# Patient Record
Sex: FEMALE | Race: BLACK OR AFRICAN AMERICAN
[De-identification: names, ages, dates, MRNs, and addresses within clinical notes are randomized per-mention and may not be internally consistent; named-entity substitution may affect disease eponyms.]

---

## 2020-06-25 ENCOUNTER — HOSPITAL ENCOUNTER (OUTPATIENT)
Dept: HOSPITAL 62 - WI | Age: 45
End: 2020-06-25
Attending: NURSE PRACTITIONER
Payer: COMMERCIAL

## 2020-06-25 DIAGNOSIS — Z12.31: Primary | ICD-10-CM

## 2020-06-25 PROCEDURE — 77067 SCR MAMMO BI INCL CAD: CPT

## 2020-06-25 PROCEDURE — 77063 BREAST TOMOSYNTHESIS BI: CPT

## 2020-06-25 NOTE — WOMENS IMAGING REPORT
EXAM DESCRIPTION:  3D SCREENING MAMMO BILAT



IMAGES COMPLETED DATE/TIME:  6/25/2020 3:31 pm



REASON FOR STUDY:  Z12.31 ENCOUNTER FOR SCREENING MAMMOGRAM FOR MALIGNANT NEOPLASM OF BREAST Z12.31  
ENCNTR SCREEN MAMMOGRAM FOR MALIGNANT NEOPLASM OF TERESA



COMPARISON:  None.



EXAM PARAMETERS:  Views: Standard craniocaudal and mediolateral oblique views of each breast recorded
 using digital acquisition and breast tomosynthesis.

Read with the assistance of CAD.

.Formerly [facility] - R2  Version 9.2



LIMITATIONS:  None.



FINDINGS:  No suspicious masses, suspicious calcifications or architectural distortion. No areas of c
oncern.



IMPRESSION:   NEGATIVE MAMMOGRAM. BIRADS 1.



BREAST DENSITY:  b. There are scattered areas of fibroglandular density.



BIRAD:  ASSESSMENT:  1 NEGATIVE



RECOMMENDATION:  ROUTINE SCREENING



COMMENT:  The patient has been notified of the results by letter per MQSA requirements. Additional no
tification policies are in place for contacting patient with suspicious or incomplete findings.

Quality ID #225: The [employer] College of [employer] recommends an annual screening mammogram for women
 aged 40 years or over. This facility utilizes a reminder system to ensure that all patients receive 
reminder letters, and/or direct phone calls for appointments. This includes reminders for routine scr
eening mammograms, diagnostic mammograms, or other Breast Imaging Interventions when appropriate.  Th
is patient will be placed in the appropriate reminder system.



TECHNICAL DOCUMENTATION:  FINDING NUMBER: (1)

ASSESSMENT:  (1)

JOB ID:  [id number]

 2011 [employer]- All Rights Reserved



Reading location - IP/workstation name: [email or web address]

## 2020-07-10 LAB
ADD MANUAL DIFF: NO
ANION GAP SERPL CALC-SCNC: 8 MMOL/L (ref 5–19)
APPEARANCE UR: CLEAR
APTT PPP: YELLOW S
BASOPHILS # BLD AUTO: 0.1 10^3/UL (ref 0–0.2)
BASOPHILS NFR BLD AUTO: 1 % (ref 0–2)
BILIRUB UR QL STRIP: NEGATIVE
BUN SERPL-MCNC: 17 MG/DL (ref 7–20)
CALCIUM: 9.6 MG/DL (ref 8.4–10.2)
CHLORIDE SERPL-SCNC: 97 MMOL/L (ref 98–107)
CO2 SERPL-SCNC: 31 MMOL/L (ref 22–30)
EOSINOPHIL # BLD AUTO: 0 10^3/UL (ref 0–0.6)
EOSINOPHIL NFR BLD AUTO: 0.8 % (ref 0–6)
ERYTHROCYTE [DISTWIDTH] IN BLOOD BY AUTOMATED COUNT: 15.2 % (ref 11.5–14)
GLUCOSE SERPL-MCNC: 93 MG/DL (ref 75–110)
GLUCOSE UR STRIP-MCNC: NEGATIVE MG/DL
HCT VFR BLD CALC: 39.4 % (ref 36–47)
HGB BLD-MCNC: 13.1 G/DL (ref 12–15.5)
KETONES UR STRIP-MCNC: NEGATIVE MG/DL
LYMPHOCYTES # BLD AUTO: 1.4 10^3/UL (ref 0.5–4.7)
LYMPHOCYTES NFR BLD AUTO: 26.2 % (ref 13–45)
MCH RBC QN AUTO: 26.7 PG (ref 27–33.4)
MCHC RBC AUTO-ENTMCNC: 33.3 G/DL (ref 32–36)
MCV RBC AUTO: 80 FL (ref 80–97)
MONOCYTES # BLD AUTO: 0.3 10^3/UL (ref 0.1–1.4)
MONOCYTES NFR BLD AUTO: 6.5 % (ref 3–13)
NEUTROPHILS # BLD AUTO: 3.5 10^3/UL (ref 1.7–8.2)
NEUTS SEG NFR BLD AUTO: 65.5 % (ref 42–78)
NITRITE UR QL STRIP: NEGATIVE
PH UR STRIP: 7 [PH] (ref 5–9)
PLATELET # BLD: 238 10^3/UL (ref 150–450)
POTASSIUM SERPL-SCNC: 3.1 MMOL/L (ref 3.6–5)
PROT UR STRIP-MCNC: 30 MG/DL
RBC # BLD AUTO: 4.92 10^6/UL (ref 3.72–5.28)
SP GR UR STRIP: 1.02
TOTAL CELLS COUNTED % (AUTO): 100 %
UROBILINOGEN UR-MCNC: NEGATIVE MG/DL (ref ?–2)
WBC # BLD AUTO: 5.3 10^3/UL (ref 4–10.5)

## 2020-07-10 NOTE — RADIOLOGY REPORT (SQ)
EXAM DESCRIPTION:  CHEST PA/LATERAL



IMAGES COMPLETED DATE/TIME:  7/10/2020 12:46 pm



REASON FOR STUDY:  PREOP



COMPARISON:  None.



EXAM PARAMETERS:  NUMBER OF VIEWS: Two views.

TECHNIQUE:  PA and lateral views of the chest were obtained.

RADIATION DOSE: NA.

LIMITATIONS: None.



FINDINGS:  LUNGS AND PLEURA: No consolidation, pleural effusion or pneumothorax.

MEDIASTINUM AND HILAR STRUCTURES: No mediastinal or hilar contour abnormality.

HEART AND VASCULAR STRUCTURES: The cardiac silhouette and pulmonary vasculature are within normal shoemaker
its.

BONES: No acute findings.

HARDWARE: None in the chest.

OTHER: No other finding.



IMPRESSION:  No acute cardiopulmonary process.



TECHNICAL DOCUMENTATION:  JOB ID:  [id number]

 2011 [employer]- All Rights Reserved



Reading location - IP/workstation name: [email or web address]

## 2020-07-22 ENCOUNTER — HOSPITAL ENCOUNTER (OUTPATIENT)
Dept: HOSPITAL 62 - OROUT | Age: 45
Discharge: HOME | End: 2020-07-22
Attending: ORTHOPAEDIC SURGERY
Payer: COMMERCIAL

## 2020-07-22 VITALS — DIASTOLIC BLOOD PRESSURE: 90 MMHG | SYSTOLIC BLOOD PRESSURE: 139 MMHG

## 2020-07-22 DIAGNOSIS — M48.02: ICD-10-CM

## 2020-07-22 DIAGNOSIS — M50.122: Primary | ICD-10-CM

## 2020-07-22 PROCEDURE — C9803 HOPD COVID-19 SPEC COLLECT: HCPCS

## 2020-07-22 PROCEDURE — 85025 COMPLETE CBC W/AUTO DIFF WBC: CPT

## 2020-07-22 PROCEDURE — 80048 BASIC METABOLIC PNL TOTAL CA: CPT

## 2020-07-22 PROCEDURE — 93010 ELECTROCARDIOGRAM REPORT: CPT

## 2020-07-22 PROCEDURE — 87635 SARS-COV-2 COVID-19 AMP PRB: CPT

## 2020-07-22 PROCEDURE — 84132 ASSAY OF SERUM POTASSIUM: CPT

## 2020-07-22 PROCEDURE — 71046 X-RAY EXAM CHEST 2 VIEWS: CPT

## 2020-07-22 PROCEDURE — 72040 X-RAY EXAM NECK SPINE 2-3 VW: CPT

## 2020-07-22 PROCEDURE — 87070 CULTURE OTHR SPECIMN AEROBIC: CPT

## 2020-07-22 PROCEDURE — 81025 URINE PREGNANCY TEST: CPT

## 2020-07-22 PROCEDURE — 36415 COLL VENOUS BLD VENIPUNCTURE: CPT

## 2020-07-22 PROCEDURE — 22856 TOT DISC ARTHRP 1NTRSPC CRV: CPT

## 2020-07-22 PROCEDURE — 81001 URINALYSIS AUTO W/SCOPE: CPT

## 2020-07-22 PROCEDURE — 93005 ELECTROCARDIOGRAM TRACING: CPT

## 2020-07-22 NOTE — RADIOLOGY REPORT (SQ)
EXAM DESCRIPTION:  CERV SP 3 VIEW OR LESS; NO CHG FLUORO



IMAGES COMPLETED DATE/TIME:  7/22/2020 10:31 am



REASON FOR STUDY:  CERVICAL ARTIFICIAL DISK PLACEMENT ASSISTED WITH FLUORO IN OR M50.10  CERVICAL DIS
C DISORDER W RADICULOPATHY, UNSP CERVICAL M54.12  RADICULOPATHY, CERVICAL REGION M50.30  OTHER CERVIC
AL DISC DEGENERATION, UNSP CERVICAL REGIO



COMPARISON:  None.



FLUOROSCOPY TIME:  1.2 minutes

Spot images saved to PACS.



TECHNIQUE:  Intra-operative images acquired during surgical procedure to evaluate progress.

NUMBER OF IMAGES: 3



LIMITATIONS:  None.



FINDINGS:  Fluoroscopy was provided for intraoperative procedure.  Please refer to the operative repo
rt for further discussion.



IMPRESSION:  IMAGE(S) OBTAINED DURING PROCEDURE.



COMMENT:  Quality :  Final reports for procedures using fluoroscopy that document radiation exp
osure indices, or exposure time and number of fluorographic images (if radiation exposure indices are
 not available)

Please consult full operative report of the attending physician for description of the procedure.



TECHNICAL DOCUMENTATION:  JOB ID:  [id number]

 2011 [employer]- All Rights Reserved



Reading location - IP/workstation name: [email or web address]

## 2020-07-22 NOTE — RADIOLOGY REPORT (SQ)
EXAM DESCRIPTION:  CERV SP 3 VIEW OR LESS; NO CHG FLUORO



IMAGES COMPLETED DATE/TIME:  7/22/2020 10:31 am



REASON FOR STUDY:  CERVICAL ARTIFICIAL DISK PLACEMENT ASSISTED WITH FLUORO IN OR M50.10  CERVICAL DIS
C DISORDER W RADICULOPATHY, UNSP CERVICAL M54.12  RADICULOPATHY, CERVICAL REGION M50.30  OTHER CERVIC
AL DISC DEGENERATION, UNSP CERVICAL REGIO



COMPARISON:  None.



FLUOROSCOPY TIME:  1.2 minutes

Spot images saved to PACS.



TECHNIQUE:  Intra-operative images acquired during surgical procedure to evaluate progress.

NUMBER OF IMAGES: 3



LIMITATIONS:  None.



FINDINGS:  Fluoroscopy was provided for intraoperative procedure.  Please refer to the operative repo
rt for further discussion.



IMPRESSION:  IMAGE(S) OBTAINED DURING PROCEDURE.



COMMENT:  Quality :  Final reports for procedures using fluoroscopy that document radiation exp
osure indices, or exposure time and number of fluorographic images (if radiation exposure indices are
 not available)

Please consult full operative report of the attending physician for description of the procedure.



TECHNICAL DOCUMENTATION:  JOB ID:  [id number]

 2011 [employer]- All Rights Reserved



Reading location - IP/workstation name: [email or web address]

## 2020-07-22 NOTE — OPERATIVE REPORT
Operative Report


DATE OF SURGERY: 07/22/20


PREOPERATIVE DIAGNOSIS: C5-6 herniated nucleus pulposus and radiculitis stenosis

cervical spine.  Neck pain.


POSTOPERATIVE DIAGNOSIS: C5-6 herniated nucleus pulposus and radiculitis 

stenosis cervical spine.  Neck pain.


OPERATION: C5-6 anterior cervical discectomy and artificial disc replacement


SURGEON: SYBIL QUIJANO


1ST ASSISTANT: KORI HUFFMAN


ANESTHESIA: GA


COMPLICATIONS: 





None


INTRAOPERATIVE FINDINGS: Hardware utilized is from [employer] size 5 

x 16 mm official disc


PROCEDURE: 


The patient is brought into the room and placed under general anesthesia.  The 

patient received Ancef 2000 mg within 1 hour of cut time IV.


Neuro monitoring leads are placed on the patient as well as SCDs and a warming 

blanket.  A bolster is placed between the shoulder blades.  The medial 

epicondyles are well-padded and the arms are tucked at the sides.





C arm fluoroscopy is utilized to verify the appropriate level.  The skin is 

marked and after completion of prepping and draping and after appropriate 

timeout the cervical spine is incised in transverse fashion through the skin.  

Electrocautery was used to maintain hemostasis.  The platysma was incised and 

the pretracheal fascia is dissected and dissection is carried out to the 

anterior cervical spine on the left side down to the prevertebral fascia.  The 

skyline retractor was then positioned mediolaterally exposing the prevertebral 

fascia and the longus coli muscles are exposed bilaterally.  Kitners are then 

used to expose the disc space.  A bent needle is then placed into the disc 

space.  C-arm fluoroscopy in the lateral position verify the 5 6 level.





[place]in[place] pins are positioned into the uppermost part of the C5 and the inferior 

most part of the C6 vertebral bodies.  The microscope was brought in and the 

[place] pin retractors are used to retract the soft tissues cephalad and caudad. 

Under the microscope and through the assistance of Ar Huffman physician 

assistant a complete discectomy is carried out with resection of anterior spurs.

 The anterior osteophytes are resected the endplates were then also curetted to 

good bleeding bone.  The posterior longitudinal ligament is then resected and 

disc herniation is resected as well foraminotomies were performed bilaterally.  

Epidural bleeders are coagulated with bipolar electrocautery.





The appropriate sides disc replacement is found to be size stageLP 5 x 16 mm.  

After positioning and verification of good position in AP and lateral position 

using C-arm fluoroscopy the kirk are cut and then the appropriate size spacer 

implant is then positioned and tamped into place position is verified based on 

C-arm fluoroscopy and then cranial motor testing is obtained to verify good 

signals.  The wound is then irrigated with copious amounts of irrigation.  And a

7 flat Turkish drain is brought in inferior and lateral to the incision.  The 

drain is affixed with a 3-0 nylon stitch and the platysma was reapproximated 

with 2-0 Vicryl in interrupted fashion and a running subcuticular 3-0 Monocryl 

stitch is used to close the skin wounds are dressed in benzoin Steri-Strips 4 x 

4 and tape the drain is placed the negative [place] pressure the patient tolerated 

the procedure well